# Patient Record
Sex: MALE | Race: OTHER | URBAN - METROPOLITAN AREA
[De-identification: names, ages, dates, MRNs, and addresses within clinical notes are randomized per-mention and may not be internally consistent; named-entity substitution may affect disease eponyms.]

---

## 2019-04-25 ENCOUNTER — OFFICE VISIT (OUTPATIENT)
Dept: FAMILY MEDICINE CLINIC | Age: 29
End: 2019-04-25

## 2019-04-25 VITALS
BODY MASS INDEX: 24.91 KG/M2 | TEMPERATURE: 98.6 F | WEIGHT: 174 LBS | HEART RATE: 53 BPM | DIASTOLIC BLOOD PRESSURE: 70 MMHG | HEIGHT: 70 IN | SYSTOLIC BLOOD PRESSURE: 112 MMHG

## 2019-04-25 DIAGNOSIS — M54.50 LOW BACK PAIN AT MULTIPLE SITES: Primary | ICD-10-CM

## 2019-04-25 RX ORDER — CYCLOBENZAPRINE HCL 10 MG
10 TABLET ORAL
Qty: 30 TAB | Refills: 1 | Status: SHIPPED | OUTPATIENT
Start: 2019-04-25

## 2019-04-25 NOTE — PATIENT INSTRUCTIONS
Home exercises and stretches twice daily Active Rest - try to avoid the things that aggravate pain, but keep doing normal activities Ice or Heat for 15 mins at a time as needed Flexeril 10mg nightly as needed for muscle spasm Motrin 400-600mg every 6 hours as needed for pain If upset stomach on aleve/motrin, use tylenol 500mg every 4 hours as needed for pain Ejercicios en casa y [address] [place] activo: trate de evitar las cosas que agravan el dolor, chintan continúe realizando actividades normales Hielo o calor alisia 15 minutos a la vez según sea necesario Flexeril 10mg cada noche si necesita para espasmo Motrin 400-600mg cada 6 horas si necesita para dolor Si tiene malestar estomacal en aleve / motrin, use tylenol 500 mg cada 4 horas según sea necesario para el dolor Dolor de espalda, síntomas urgentes o de emergencia: Instrucciones de cuidado - [ Back Pain, Emergency or Urgent Symptoms: Care Instructions ] Instrucciones de cuidado Muchas personas tienen dolor de espalda en algún momento. En la [employer], el dolor mejora con los cuidados personales, lo que incluye analgésicos (medicamentos para el dolor) de [place], hielo, calor y ejercicios. A menos que tenga síntomas de shahid lesión grave o de ataque al corazón, es posible que pueda dejar pasar algunos días antes de llamar al médico. Chintan algunos problemas de espalda son muy graves. No ignore los síntomas que deberían ser revisados de inmediato. La atención de seguimiento es shahid parte clave de osorio tratamiento y seguridad. Asegúrese de hacer y acudir a todas las citas, y llame a osorio médico si está teniendo problemas. También es shahid buena idea saber los resultados de gunner exámenes y mantener shahid lista de los medicamentos que lyndsey. Cómo puede cuidarse en el hogar? · Siéntese o acuéstese en las posiciones más cómodas y que reduzcan el dolor. Pruebe shahid de estas posiciones cuando se acueste: ? Acuéstese boca arriba con las rodillas dobladas y apoyadas sobre [address]. ? Acuéstese en el piso con las piernas sobre el asiento de un sofá o shahid silla. ? Acuéstese de lado con las rodillas [address] las caderas y [place] las piernas. ? Acuéstese boca abajo siempre que esta posición no empeore el dolor. · No se siente sobre la cama y evite los sillones blandos, así oscar las posiciones torcidas. El reposo en cama puede aliviar el dolor al principio, hernandez retrasa la sanación. Evite reposar en la cama después del primer día. · Cambie de posición cada 30 minutos. Si debe sentarse por [employer], párese y descanse de estar sentado. Levántese y camine, o acuéstese en posición horizontal. 
· Pruebe a usar shahid almohadilla térmica a temperatura baja o mediana de 15 a 20 minutos cada 2 o 3 horas. Pruebe con Yeni Lady ducha tibia en vez de shahid sesión con la almohadilla térmica. También puede comprar envolturas calientes (\"heat wraps\") desechables que [place]kinson hasta 8 horas. También puede tratar de colocarse hielo o compresas frías en la espalda de 10 a 20 minutos cada vez, varias veces al día. (Póngase un paño horvath entre el hielo y la piel). [place] reduce el dolor y le será más fácil mantenerse activo y hacer ejercicio. · [employer] analgésicos exactamente según las indicaciones. ? Si el médico le recetó analgésicos, tómelos según las indicaciones. ? Si no está tomando un analgésico recetado, pregúntele a osorio médico si puede ysabel mary de [employer]. Cuándo debe pedir ayuda? Llame al 911 en cualquier momento que considere que necesita atención de [place]. Por ejemplo, llame si: 
  · Es absolutamente incapaz de  shahid pierna.  
  · Tiene dolor de espalda junto con dolor abdominal intenso.  
  · Tiene síntomas de un ataque al corazón. Estos pueden incluir: ? Rondi Pale en el pecho, o shahid sensación extraña en el pecho. 
? Sudoración. ? Falta de aire. ? Náuseas o vómito. ? Dolor, presión o shahid sensación extraña en la espalda, el gabriela, la mandíbula o la parte superior del abdomen, o en mary o ambos hombros o brazos. ? Aturdimiento o debilidad repentina. ? Latidos cardíacos rápidos o irregulares. Después de llamar al 911, el operador puede decirle que [place] 1 aspirina para adultos o de 2 a 4 aspirinas de dosis baja. Espere shahid ambulancia. No trate de conducir usted mismo.  
 Llame a osorio médico ahora mismo o busque atención médica inmediata si: 
  · Tiene síntomas nuevos o peores en los brazos, las piernas, el pecho, el abdomen o las nalgas. Los síntomas pueden incluir: ? Entumecimiento u hormigueo. ? Debilidad. ? Dolor.  
  · Pierde el control de la vejiga o los intestinos.  
  · Tiene dolor de espalda y: 
? Se ha lesionado la espalda al levantar o al hacer alguna [place]. Llame si el dolor es intenso, no ha desaparecido después de 1 o 2 días y no puede hacer gunner actividades normales diarias. ? Anteriormente ha tenido shahid lesión en la espalda que requirió [place]. ? El dolor ha durado más de 4 [facility] ? Roach perdido peso de [place] inexplicable. ? Tiene fiebre. ? Tiene [address] ? Tiene cáncer o lo roach tenido con anterioridad.  
 Preste especial atención a los cambios en osorio ambreen y asegúrese de comunicarse con osorio médico si no mejora oscar se esperaba. Dónde puede encontrar más información en inglés? Oleg Day a http://b[place].[email or web address] Jojo Mathias [id number] en la búsqueda para aprender más acerca de \"Dolor de espalda, síntomas urgentes o de emergencia: Instrucciones de cuidado - [ Back Pain, Emergency or Urgent Symptoms: Care Instructions ]. \" 
Revisado: 23 septiembre, 2018 Versión del contenido: 11.9 © 8002-9036 [employer], [employer] Las instrucciones de cuidado fueron adaptadas bajo licencia por [employer] (which disclaims liability or warranty for this information).  Si usted tiene preguntas sobre shahid afección médica o sobre estas instrucciones, siempre pregunte a osorio profesional de ambreen. [facility] niega toda garantía o responsabilidad por osorio uso de esta información.
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
